# Patient Record
Sex: MALE | Race: WHITE
[De-identification: names, ages, dates, MRNs, and addresses within clinical notes are randomized per-mention and may not be internally consistent; named-entity substitution may affect disease eponyms.]

---

## 2018-04-01 ENCOUNTER — HOSPITAL ENCOUNTER (EMERGENCY)
Dept: HOSPITAL 25 - ED | Age: 28
Discharge: HOME | End: 2018-04-01
Payer: COMMERCIAL

## 2018-04-01 VITALS — SYSTOLIC BLOOD PRESSURE: 132 MMHG | DIASTOLIC BLOOD PRESSURE: 90 MMHG

## 2018-04-01 DIAGNOSIS — F17.210: ICD-10-CM

## 2018-04-01 DIAGNOSIS — R60.0: ICD-10-CM

## 2018-04-01 DIAGNOSIS — K02.9: ICD-10-CM

## 2018-04-01 DIAGNOSIS — K04.7: Primary | ICD-10-CM

## 2018-04-01 PROCEDURE — 99282 EMERGENCY DEPT VISIT SF MDM: CPT

## 2018-04-01 NOTE — ED
Yudelka GONZALEZ Emily, scribed for Miguel Rodriguez MD on 04/01/18 at 0410 .





Complex/Multi-Sys Presentation





- HPI Summary


HPI Summary: 


This patient is a 27 year old M presenting to [facility] with a chief complaint of 

left bottom tooth pain after losing a filling that began yesterday. The patient 

rates the pain 8/10 in severity. Symptoms aggravated by nothing. Symptoms 

alleviated by nothing. Patient reports swelling of L cheek.








- History Of Current Complaint


Chief Complaint: EDDentalPain


Hx Obtained From: Patient


Onset/Duration: Sudden Onset, Lasting Days, Still Present


Timing: Constant


Severity Currently: Severe


Severity Initially: Severe


Aggravating Factor(s): Nothing


Alleviating Factor(s): Nothing


Associated Signs And Symptoms: Positive: Edema





- Allergies/Home Medications


Allergies/Adverse Reactions: 


 Allergies











Allergy/AdvReac Type Severity Reaction Status Date / Time


 


No Known Allergies Allergy   Verified 04/01/18 03:47














PMH/Surg Hx/FS Hx/Imm Hx


Previously Healthy: Yes


Opthamlomology History: 


   Denies: Hx Legally Blind


EENT History: 


   Denies: Hx Deafness





- Surgical History


Surgery Procedure, Year, and Place: Left wrist/hand surgery - January 2016


Infectious Disease History: No


Infectious Disease History: 


   Denies: History Other Infectious Disease, Traveled Outside the [place] in Last 30 

Days





- Family History


Known Family History: Positive: Hypertension





- Social History


Occupation: Employed Full-time


Lives: With Family


Alcohol Use: None


Substance Use Type: Reports: None


Smoking Status (MU): Light Every Day Tobacco Smoker


Type: Cigarettes





Review of Systems


Positive: Dental Pain


Positive: Edema


All Other Systems Reviewed And Are Negative: Yes





Physical Exam





- Summary


Physical Exam Summary: 


Appearance: Well appearing, no pain distress


Skin: warm, dry, reflects adequate perfusion


Head/face: normal


Eyes: EOMI, GRICEL


Dental Exam: The left lower first premolar has erosion at the gum line. A 

little bit of softness and edema in the adjacent gingiva. 


ENT: normal


Neck: supple, non-tender


Respiratory: CTA, breath sounds present


Cardiovascular: RRR, pulses symmetrical 


Abdomen: non-tender, soft


Bowel Sounds: present


Musculoskeletal: normal, strength/ROM intact, Mild facial swelling. 


Neuro: normal, sensory motor intact, A&Ox3 





Triage Information Reviewed: Yes


Vital Signs On Initial Exam: 


 Initial Vitals











Temp Pulse Resp BP Pulse Ox


 


 97.5 F   82   20   143/86   99 


 


 04/01/18 03:17  04/01/18 03:17  04/01/18 03:17  04/01/18 03:17  04/01/18 03:17











Vital Signs Reviewed: Yes





Procedures





- Procedure Summary


Procedure Summary: 


Inferior alveolar nerve block:


Re: dental pain


Desc: Infraoral approach to L inf alveolar nerve with 27g needle, infused with 

2cc of 0.5% Bupivacaine. Complete relief achieved. Wang well. No comps.








Diagnostics





- Vital Signs


 Vital Signs











  Temp Pulse Resp BP Pulse Ox


 


 04/01/18 03:45  97.8 F  73  18  139/86  99


 


 04/01/18 03:17  97.5 F  82  20  143/86  99














- Laboratory


Lab Statement: Any lab studies that have been ordered have been reviewed, and 

results considered in the medical decision making process.





Re-Evaluation





- Re-Evaluation


  ** First Eval


Change: Improved





Complex Multi-Symp Course/Dx


Course Of Treatment: Small previously filled leticia with filling out, adjacent 

small facial swelling. Pain gone with block. Start PenVK. Refer back to his 

dentist.





- Diagnoses


Provider Diagnoses: 


 Infected dental carries, Pain, dental, Dental abscess








Discharge





- Sign-Out/Discharge


Documenting (check all that apply): Discharge





- Discharge Plan


Condition: Good


Disposition: HOME


Prescriptions: 


Penicillin  MG TAB(NF) [Penicillin  mg Tab] 500 mg PO QID #40 tab


Patient Education Materials:  Dental Abscess (ED)


Referrals: 


Cody Nuñez MD [Primary Care Provider] - 


Additional Instructions: 


See your dentist on Monday.


Return for uncontrolled pain, increased facial swelling, fever or other 

concerns.


Continue ibuprofen as needed for pain.





The documentation as recorded by the Yudelka sanchez Emily accurately reflects the 

service I personally performed and the decisions made by me, Miguel Rodriguez MD.